# Patient Record
Sex: FEMALE | Race: BLACK OR AFRICAN AMERICAN
[De-identification: names, ages, dates, MRNs, and addresses within clinical notes are randomized per-mention and may not be internally consistent; named-entity substitution may affect disease eponyms.]

---

## 2018-09-16 ENCOUNTER — HOSPITAL ENCOUNTER (EMERGENCY)
Dept: HOSPITAL 25 - UCCORT | Age: 23
Discharge: HOME | End: 2018-09-16
Payer: COMMERCIAL

## 2018-09-16 VITALS — SYSTOLIC BLOOD PRESSURE: 116 MMHG | DIASTOLIC BLOOD PRESSURE: 66 MMHG

## 2018-09-16 PROCEDURE — 87086 URINE CULTURE/COLONY COUNT: CPT

## 2018-09-16 PROCEDURE — 99202 OFFICE O/P NEW SF 15 MIN: CPT

## 2018-09-16 PROCEDURE — 81003 URINALYSIS AUTO W/O SCOPE: CPT

## 2018-09-16 PROCEDURE — G0463 HOSPITAL OUTPT CLINIC VISIT: HCPCS

## 2018-09-16 PROCEDURE — 84702 CHORIONIC GONADOTROPIN TEST: CPT

## 2018-09-16 NOTE — UC
Abdominal Pain Female HPI





- HPI Summary


HPI Summary: 


was drinking vodka and sangria last night usual how much she drank but " less 

than usual I did not black out or anything"--began throwing up at 7:30 am---and 

has not been able to keep down any fluids---








- History of Current Complaint


Chief Complaint: [facility]CG[facility]


Stated Complaint: NAUSEA,VOMITING,CHILLS


Time Seen by Provider: 09/16/18 14:21


Hx Obtained From: Patient


Hx Last Menstrual Period: 09/02/18


Pregnant?: No


Onset/Duration: Sudden Onset, Lasting Hours - 7, Still Present


Timing: Constant


Pain Intensity: 0


Pain Scale Used: 0-10 Numeric - no pain right now


Character: Cramping


Aggravating Factor(s): Food


Alleviating Factor(s): NPO


Associated Signs and Symptoms: Positive: Nausea, Vomiting


Allergies/Adverse Reactions: 


 Allergies











Allergy/AdvReac Type Severity Reaction Status Date / Time


 


amoxicillin Allergy  Anaphylatic Verified 09/16/18 14:10





   Shock  











Home Medications: 


 Home Medications





Norgestimate-Ethinyl Estradiol [Ortho-Cyclen 28 Tablet] 1 each PO DAILY 09/16/ 18 [History Confirmed 09/16/18]











PMH/Surg Hx/FS Hx/Imm Hx


Previously Healthy: Yes





- Surgical History


Surgical History: None





- Family History


Known Family History: Positive: None





- Social History


Occupation: Student


Lives: Dormitory/Roommates


Alcohol Use: Weekly


Alcohol Amount: "drank less than usual last night I did not black out"


Substance Use Type: Marijuana


Substance Use Comment - Amount & Last Used: DAILY


Smoking Status (MU): Never Smoked Tobacco





Review of Systems


Constitutional: Chills - after vomiting


Skin: Negative


Eyes: Negative


ENT: Negative


Respiratory: Negative


Cardiovascular: Negative


Gastrointestinal: Abdominal Pain - none at time of assessment, Vomiting, Nausea


Genitourinary: Negative


Motor: Negative


Neurovascular: Negative


Musculoskeletal: Negative


Neurological: Negative


Psychological: Negative


Is Patient Immunocompromised?: No


All Other Systems Reviewed And Are Negative: Yes





Physical Exam


Triage Information Reviewed: Yes


Appearance: Well-Appearing, No Pain Distress, Well-Nourished


Vital Signs: 


 Initial Vital Signs











Temp  98.0 F   09/16/18 14:08


 


Pulse  94   09/16/18 14:08


 


Resp  17   09/16/18 14:08


 


BP  116/66   09/16/18 14:08


 


Pulse Ox  99   09/16/18 14:08











Vital Signs Reviewed: Yes


Eye Exam: Normal


Eyes: Positive: Conjunctiva Clear


ENT Exam: Normal


ENT: Positive: Normal ENT inspection, Hearing grossly normal, TMs normal.  

Negative: Nasal congestion, Trismus, Muffled voice, Hoarse voice


Dental Exam: Normal


Neck exam: Normal


Neck: Positive: Supple, Nontender, No Lymphadenopathy


Respiratory Exam: Normal


Respiratory: Positive: Chest non-tender, Lungs clear, Normal breath sounds, No 

respiratory distress, No accessory muscle use


Cardiovascular Exam: Normal


Cardiovascular: Positive: RRR, No Murmur, Pulses Normal, Brisk Capillary Refill


Abdominal Exam: Normal


Abdomen Description: Positive: Nontender, No Organomegaly, Soft.  Negative: CVA 

Tenderness (R), CVA Tenderness (L), Distended, Guarding, Hepatomegaly, McBurney'

s Point Tenderness, Splenomegaly


Bowel Sounds: Positive: Present


Musculoskeletal Exam: Normal


Musculoskeletal: Positive: Strength Intact, ROM Intact, No Edema


Neurological Exam: Normal


Neurological: Positive: Alert, Muscle Tone Normal


Psychological Exam: Normal


Skin Exam: Normal





Re-Evaluation





- Re-Evaluation


  ** First Eval


Change: Improved - taking po fluids well, is hungry, safe drinking reviewed 

with patient and diet to advance slowly today





Abd Pain Female Course/Dx





- Course


Course Of Treatment: safe drinking information, SBIRT, advance diet slowly to 

day follow at [facility] as needed





- Differential Dx/Diagnosis


Provider Diagnoses: acute nausea/vomiting, SBIRT alcohol use





Discharge





- Sign-Out/Discharge


Documenting (check all that apply): Patient Departure


All imaging exams completed and their final reports reviewed: No Studies





- Discharge Plan


Condition: Stable


Disposition: HOME


Patient Education Materials:  At-Risk Alcohol Use (ED), Acute Nausea and 

Vomiting (ED)


Forms:  *School Release


Referrals: 


[facility] [Outside] - If Needed





- Billing Disposition and Condition


Condition: STABLE


Disposition: Home